# Patient Record
Sex: MALE | Race: WHITE | NOT HISPANIC OR LATINO | URBAN - METROPOLITAN AREA
[De-identification: names, ages, dates, MRNs, and addresses within clinical notes are randomized per-mention and may not be internally consistent; named-entity substitution may affect disease eponyms.]

---

## 2018-09-06 ENCOUNTER — OFFICE (OUTPATIENT)
Dept: URBAN - METROPOLITAN AREA CLINIC 11 | Facility: CLINIC | Age: 23
End: 2018-09-06
Payer: COMMERCIAL

## 2018-09-06 DIAGNOSIS — K44.9 DIAPHRAGMATIC HERNIA WITHOUT OBSTRUCTION OR GANGRENE: ICD-10-CM

## 2018-09-06 DIAGNOSIS — R13.10 DYSPHAGIA, UNSPECIFIED: ICD-10-CM

## 2018-09-06 PROCEDURE — 91010 ESOPHAGUS MOTILITY STUDY: CPT

## 2019-07-22 ENCOUNTER — OFFICE (OUTPATIENT)
Dept: URBAN - METROPOLITAN AREA CLINIC 19 | Facility: CLINIC | Age: 24
End: 2019-07-22

## 2019-07-22 VITALS
WEIGHT: 120 LBS | DIASTOLIC BLOOD PRESSURE: 81 MMHG | SYSTOLIC BLOOD PRESSURE: 132 MMHG | HEIGHT: 71 IN | HEART RATE: 55 BPM

## 2019-07-22 DIAGNOSIS — K21.9 GASTRO-ESOPHAGEAL REFLUX DISEASE WITHOUT ESOPHAGITIS: ICD-10-CM

## 2019-07-22 DIAGNOSIS — R10.11 RIGHT UPPER QUADRANT PAIN: ICD-10-CM

## 2019-07-22 DIAGNOSIS — R13.10 DYSPHAGIA, UNSPECIFIED: ICD-10-CM

## 2019-07-22 PROCEDURE — 99213 OFFICE O/P EST LOW 20 MIN: CPT | Performed by: NURSE PRACTITIONER

## 2019-07-22 RX ORDER — PANTOPRAZOLE SODIUM 40 MG/1
40 TABLET, DELAYED RELEASE ORAL
Qty: 30 | Refills: 11 | Status: COMPLETED
Start: 2019-07-22 | End: 2021-07-14

## 2019-07-22 NOTE — SERVICEHPINOTES
Mr. Emerson is a 23 year old white male previously seen by Dr. Wilkins. For the last year, he was seen by Dr. Farris.  Three weeks ago after drinking water he began experiencing right upper quadrant pain. The pain continued until about 3 days ago.  He describes the pain as dull in mainly constant.  It would be worse after eating certain foods such as [place] cheese steaks.  However, he would also have the taste acid in his mouth when he wakes up in the morning and sometimes epigastric pain before he would eat.  He has a long history of reflux and is currently taking Dexilant 30 mg daily.  He feels that the Dexilant is working well, but he does taste acid in the mornings.  In 2011 he had an EGD emergently due to food bolus stuck in his esophagus as stricture.  He also had an EGD in 2018 due to dysphagia where an esophageal stricture, gastritis, and esophagitis was also seen. Biopsies were negative for H pylori, and he had no Segal's esophagus. Before this pain started, he denies any NSAID use.  At the same time that this pain in his right upper quadrant began, he had an episode of nausea, vomiting, and diarrhea.  He feels that he might have gotten a hold of something bad to eat.  His bowels are now back to normal, but on 3 occasions he did see a scant amount of bright red blood on the toilet paper.  Once he did see bright red blood in the toilet.  He did have some slight rectal discomfort when he did see the blood.  He denies any further hematochezia and no more rectal pain. Yesterday, he began taking Zantac 150 mg twice a day and a probiotic.  His symptoms have significantly improved since he started taking these.  An ultrasound at his PCP was normal as well as normal CMP, CBC, amylase and lipase.  His weight has been stable through this.

## 2019-07-22 NOTE — SERVICENOTES
The patient's assessment was discussed face to face with Dr. Kumar and a collaborative plan of care was established.

## 2020-06-24 ENCOUNTER — OFFICE (OUTPATIENT)
Dept: URBAN - METROPOLITAN AREA CLINIC 19 | Facility: CLINIC | Age: 25
End: 2020-06-24

## 2020-06-24 VITALS
SYSTOLIC BLOOD PRESSURE: 105 MMHG | DIASTOLIC BLOOD PRESSURE: 72 MMHG | WEIGHT: 125 LBS | HEIGHT: 71 IN | HEART RATE: 63 BPM

## 2020-06-24 DIAGNOSIS — R10.32 LEFT LOWER QUADRANT PAIN: ICD-10-CM

## 2020-06-24 DIAGNOSIS — K21.9 GASTRO-ESOPHAGEAL REFLUX DISEASE WITHOUT ESOPHAGITIS: ICD-10-CM

## 2020-06-24 LAB
CBC, PLATELET, NO DIFFERENTIAL: HEMATOCRIT: 46.6 % (ref 37.5–51)
CBC, PLATELET, NO DIFFERENTIAL: HEMOGLOBIN: 15.3 G/DL (ref 13–17.7)
CBC, PLATELET, NO DIFFERENTIAL: MCH: 31.5 PG (ref 26.6–33)
CBC, PLATELET, NO DIFFERENTIAL: MCHC: 32.8 G/DL (ref 31.5–35.7)
CBC, PLATELET, NO DIFFERENTIAL: MCV: 96 FL (ref 79–97)
CBC, PLATELET, NO DIFFERENTIAL: PLATELETS: 267 X10E3/UL (ref 150–450)
CBC, PLATELET, NO DIFFERENTIAL: RBC: 4.85 X10E6/UL (ref 4.14–5.8)
CBC, PLATELET, NO DIFFERENTIAL: RDW: 12.1 % (ref 11.6–15.4)
CBC, PLATELET, NO DIFFERENTIAL: WBC: 6.3 X10E3/UL (ref 3.4–10.8)
COMP. METABOLIC PANEL (14): A/G RATIO: 2.4 — HIGH (ref 1.2–2.2)
COMP. METABOLIC PANEL (14): ALBUMIN: 5.1 G/DL (ref 4.1–5.2)
COMP. METABOLIC PANEL (14): ALKALINE PHOSPHATASE: 50 IU/L (ref 39–117)
COMP. METABOLIC PANEL (14): ALT (SGPT): 14 IU/L (ref 0–44)
COMP. METABOLIC PANEL (14): AST (SGOT): 18 IU/L (ref 0–40)
COMP. METABOLIC PANEL (14): BILIRUBIN, TOTAL: 0.6 MG/DL (ref 0–1.2)
COMP. METABOLIC PANEL (14): BUN/CREATININE RATIO: 8 — LOW (ref 9–20)
COMP. METABOLIC PANEL (14): BUN: 8 MG/DL (ref 6–20)
COMP. METABOLIC PANEL (14): CALCIUM: 10.5 MG/DL — HIGH (ref 8.7–10.2)
COMP. METABOLIC PANEL (14): CARBON DIOXIDE, TOTAL: 29 MMOL/L (ref 20–29)
COMP. METABOLIC PANEL (14): CHLORIDE: 100 MMOL/L (ref 96–106)
COMP. METABOLIC PANEL (14): CREATININE: 1.02 MG/DL (ref 0.76–1.27)
COMP. METABOLIC PANEL (14): EGFR IF AFRICN AM: 117 ML/MIN/1.73 (ref 59–?)
COMP. METABOLIC PANEL (14): EGFR IF NONAFRICN AM: 101 ML/MIN/1.73 (ref 59–?)
COMP. METABOLIC PANEL (14): GLOBULIN, TOTAL: 2.1 G/DL (ref 1.5–4.5)
COMP. METABOLIC PANEL (14): GLUCOSE: 96 MG/DL (ref 65–99)
COMP. METABOLIC PANEL (14): POTASSIUM: 5 MMOL/L (ref 3.5–5.2)
COMP. METABOLIC PANEL (14): PROTEIN, TOTAL: 7.2 G/DL (ref 6–8.5)
COMP. METABOLIC PANEL (14): SODIUM: 142 MMOL/L (ref 134–144)

## 2020-06-24 PROCEDURE — 99213 OFFICE O/P EST LOW 20 MIN: CPT | Performed by: NURSE PRACTITIONER

## 2020-06-24 RX ORDER — PANTOPRAZOLE SODIUM 40 MG/1
TABLET, DELAYED RELEASE ORAL
Qty: 0 | Refills: 0 | Status: ACTIVE

## 2020-06-24 NOTE — SERVICEHPINOTES
Mr. Emerson presents with left lower quadrant pain.  He says for the last 3 weeks he has been having a dull pain on the lateral abdomen.  At the beginning it was radiating into his flank.  He was also having urinary frequency.  He was seen by his PCP given 3 days of Cipro.  He was told he had no blood in his stool.  The pain has decreased to now he describes it as a pressure in his left lower quadrant.  He has had no change in his bowel habits.  He denies any fever, nausea, vomiting, and he has not tried any medicine for this.  He also reports finding a hole his rectum. He denies any mucus, discharge, blood, or rectal pain. He says he noticed it when he was looking in the mirror.  The pantoprazole 40 mg once a day is working well for his reflux.  We discussed this with safe to continue taking if he was having consistent reflux.